# Patient Record
Sex: MALE | URBAN - METROPOLITAN AREA
[De-identification: names, ages, dates, MRNs, and addresses within clinical notes are randomized per-mention and may not be internally consistent; named-entity substitution may affect disease eponyms.]

---

## 2022-05-27 ENCOUNTER — TELEPHONE (OUTPATIENT)
Dept: NEUROLOGY | Facility: CLINIC | Age: 37
End: 2022-05-27

## 2022-05-27 NOTE — TELEPHONE ENCOUNTER
Patient calling to schedule new patient appointment for nerve pain in face  Patient states he has never seen a doctor or had any testing done  Triage intake sent